# Patient Record
Sex: MALE | Race: WHITE | NOT HISPANIC OR LATINO | Employment: OTHER | URBAN - METROPOLITAN AREA
[De-identification: names, ages, dates, MRNs, and addresses within clinical notes are randomized per-mention and may not be internally consistent; named-entity substitution may affect disease eponyms.]

---

## 2018-03-26 ENCOUNTER — OFFICE VISIT (OUTPATIENT)
Dept: NEUROLOGY | Facility: CLINIC | Age: 73
End: 2018-03-26
Payer: MEDICARE

## 2018-03-26 VITALS
HEART RATE: 104 BPM | RESPIRATION RATE: 18 BRPM | WEIGHT: 224 LBS | DIASTOLIC BLOOD PRESSURE: 70 MMHG | HEIGHT: 70 IN | BODY MASS INDEX: 32.07 KG/M2 | SYSTOLIC BLOOD PRESSURE: 108 MMHG

## 2018-03-26 DIAGNOSIS — R20.2 NUMBNESS AND TINGLING OF BOTH FEET: Primary | ICD-10-CM

## 2018-03-26 DIAGNOSIS — M54.16 LUMBAR RADICULAR PAIN: ICD-10-CM

## 2018-03-26 DIAGNOSIS — L81.9 DISCOLORATION OF SKIN: ICD-10-CM

## 2018-03-26 DIAGNOSIS — C91.10 CLL (CHRONIC LYMPHOCYTIC LEUKEMIA): ICD-10-CM

## 2018-03-26 DIAGNOSIS — R20.0 NUMBNESS AND TINGLING OF BOTH FEET: Primary | ICD-10-CM

## 2018-03-26 PROCEDURE — 99999 PR STA SHADOW: CPT | Mod: PBBFAC,,, | Performed by: PSYCHIATRY & NEUROLOGY

## 2018-03-26 PROCEDURE — 99204 OFFICE O/P NEW MOD 45 MIN: CPT | Mod: S$PBB | Performed by: PSYCHIATRY & NEUROLOGY

## 2018-03-26 PROCEDURE — 99999 PR PBB SHADOW E&M-NEW PATIENT-LVL IV: CPT | Mod: PBBFAC,,, | Performed by: PSYCHIATRY & NEUROLOGY

## 2018-03-26 PROCEDURE — 99204 OFFICE O/P NEW MOD 45 MIN: CPT | Mod: PBBFAC | Performed by: PSYCHIATRY & NEUROLOGY

## 2018-03-26 RX ORDER — ACETAMINOPHEN 500 MG
1 TABLET ORAL DAILY
COMMUNITY

## 2018-03-26 RX ORDER — ASPIRIN 81 MG/1
81 TABLET ORAL DAILY
COMMUNITY

## 2018-03-26 RX ORDER — ALPRAZOLAM 1 MG/1
1 TABLET ORAL 2 TIMES DAILY PRN
Refills: 0 | COMMUNITY
Start: 2017-12-26

## 2018-03-26 RX ORDER — MULTIVITAMIN
1 TABLET ORAL DAILY
COMMUNITY

## 2018-03-26 RX ORDER — GABAPENTIN 100 MG/1
CAPSULE ORAL
Qty: 90 CAPSULE | Refills: 12 | Status: SHIPPED | OUTPATIENT
Start: 2018-03-26

## 2018-03-26 NOTE — PROGRESS NOTES
Consult from ROOPA Figueroa    HPI: Sunny Douglas is a 72 y.o. male with history of pain in the leg which started as numbness and tingling in the right lower leg below the knee starting 5 years ago. He has progressive numbness in the right foot and this has started in the left leg as well since a 2 years or so. There is no numbness in the hands.  He does not use any for pain  He does have back pain which is radicular to the posterior legs down the ankles bilaterally for many years as well.   MRI L spine was tried but patient was too claustrophobic no medications were given /patient refused MRI  Patient had B12, TSh, CMP, CBC with PCP  A few weeks ago, patient had DVT US which he states was negative for any clot int he legs.  He was given compression stocking with cardiology who told him he has no PAD.   He mentions stress over a case going on with property settlement  The patient stopped drinking EtOH in august- he was drinking 6 pack of beer per day.   Review of Systems   HENT: Negative for nosebleeds.    Eyes: Negative for double vision.   Respiratory: Negative for hemoptysis.    Cardiovascular: Negative for leg swelling.   Gastrointestinal: Negative for blood in stool.   Genitourinary: Negative for hematuria.   Musculoskeletal: Positive for back pain.   Skin: Negative for rash.   Neurological: Positive for sensory change.   Psychiatric/Behavioral: The patient does not have insomnia.          I have reviewed all of this patient's past medical and surgical histories as well as family and social histories and active allergies and medications as documented in the electronic medical record.        Exam:  Gen Appearance, well developed/nourished in no apparent distress  CV: 2+ distal pulses with no edema or swelling  Neuro:  MS: Awake, alert, oriented to place, person, time, situation. Sustains attention. Recent/remote memory intact, Language is full to spontaneous speech/repetition/naming/comprehension. Fund of Knowledge  is full  CN: Optic discs are flat with normal vasculature, PERRL, Extraoccular movements and visual fields are full. Normal facial sensation and strength, Hearing symmetric, Tongue and Palate are midline and strong. Shoulder Shrug symmetric and strong.  Motor: Normal bulk, tone, no abnormal movements. 5/5 strength bilateral upper/lower extremities with 1+ reflexes and no clonus  Sensory: reduced to  light touch, pain, temp, and vibration in the feet Romberg mildly positive  Cerebellar: Finger-nose,Heal-shin, Rapid alternating movements intact  Gait: Normal stance, no ataxia  There is red petechial like discoloration of the lower legs/ feet. Patient reports this has been present for many years        Assessment/Plan: Sunny Douglas is a 72 y.o. male with numbness in the right then left lower legs/feet for the past 5 years. He also has lumbar radicular pain, varicose veins, and skin lesions in the distal legs.   I recommend:   1. CT L spine Can't tolerate MRI L spine due to severe claustrophobia  2. Obtain  B12, TSh, CMP, CBC done with PCP recently.  Note his history of CLL which can contribute to neuropathy. Note also his heavy alcohol use until 8/2017 which could contribute to these symptoms  3. EMG/NCS of the legs  4. Refer to dermatology given discoloration in the leg  5. Note he has lower extremity varicose veins which may  contribute to pain- was given compression stockings by cardiology.  6. Gabapentin trial per orders Unless sedation, mood changes or other side effects  RTC for EMG/NCS   CC:  ROOPA Figueroa

## 2018-03-26 NOTE — LETTER
March 26, 2018      Jose E Figueroa NP  [address]  [address]  [place]  [place] [address]           [facility]  [address] [facility] [place] [address]  Phone: [phone]  Fax: [phone]          Patient: Sunny Douglas   MR Number: [id number]   YOB: 1945   Date of Visit: 3/26/2018       Dear Jose E Figueroa:    Thank you for referring Sunny Douglas to me for evaluation. Attached you will find relevant portions of my assessment and plan of care.    If you have questions, please do not hesitate to call me. I look forward to following Sunny Douglas along with you.    Sincerely,    James Vigil MD    Enclosure  CC:  No Recipients    If you would like to receive this communication electronically, please contact [email or web address] or [phone] to request more information on [facility] [employer] access.    For providers and/or their staff who would like to refer a patient to [facility], please contact us through our one-stop-shop provider referral line, [facility], at [phone].    If you feel you have received this communication in error or would no longer like to receive these types of communications, please e-mail [email or web address]

## 2018-03-27 ENCOUNTER — TELEPHONE (OUTPATIENT)
Dept: NEUROLOGY | Facility: CLINIC | Age: 73
End: 2018-03-27

## 2018-03-27 NOTE — TELEPHONE ENCOUNTER
Spoke with patient he states that he takes his Xanax BID not once a day, I explained to patient that  was not changing his Xanax that he was to follow the prescribing instructions and I updated his medication card to reflect the correct way he takes the medication.

## 2018-03-27 NOTE — TELEPHONE ENCOUNTER
----- Message from Destiny Chakraborty sent at 3/27/2018 12:09 PM CDT -----  Contact: PATIENT  Sunny Douglas  MRN: [id number]  : 1945  PCP: Jose E Figueroa  Home Phone      [phone]  Work Phone      Not on file.  Mobile          [phone]      MESSAGE: Patient states that he was reading over his AVS and noticed that there was a change in the way he takes the Xanax.  He states that he usually takes this medication twice daily but the AVS states that is is to be changed to 1 daily.  Please clarify.      Phone: [phone]

## 2018-04-03 ENCOUNTER — HOSPITAL ENCOUNTER (OUTPATIENT)
Dept: RADIOLOGY | Facility: HOSPITAL | Age: 73
Discharge: HOME | End: 2018-04-03
Attending: PSYCHIATRY & NEUROLOGY
Payer: MEDICARE

## 2018-04-03 DIAGNOSIS — R20.2 NUMBNESS AND TINGLING OF BOTH FEET: ICD-10-CM

## 2018-04-03 DIAGNOSIS — M54.16 LUMBAR RADICULAR PAIN: ICD-10-CM

## 2018-04-03 DIAGNOSIS — R20.0 NUMBNESS AND TINGLING OF BOTH FEET: ICD-10-CM

## 2018-04-03 PROCEDURE — 72131 CT LUMBAR SPINE W/O DYE: CPT | Mod: TC

## 2018-04-03 PROCEDURE — 72131 CT LUMBAR SPINE W/O DYE: CPT | Mod: 26,,, | Performed by: RADIOLOGY

## 2018-04-19 ENCOUNTER — PROCEDURE VISIT (OUTPATIENT)
Dept: NEUROLOGY | Facility: CLINIC | Age: 73
End: 2018-04-19
Payer: MEDICARE

## 2018-04-19 DIAGNOSIS — R20.2 NUMBNESS AND TINGLING OF BOTH FEET: ICD-10-CM

## 2018-04-19 DIAGNOSIS — G62.9 POLYNEUROPATHY: ICD-10-CM

## 2018-04-19 DIAGNOSIS — R20.0 NUMBNESS AND TINGLING OF BOTH FEET: ICD-10-CM

## 2018-04-19 DIAGNOSIS — M54.16 LUMBAR RADICULAR PAIN: ICD-10-CM

## 2018-04-19 DIAGNOSIS — M54.17 LUMBOSACRAL RADICULOPATHY: Primary | ICD-10-CM

## 2018-04-19 PROCEDURE — 99999 PR STA SHADOW: CPT | Mod: 26,PBBFAC,, | Performed by: PSYCHIATRY & NEUROLOGY

## 2018-04-19 PROCEDURE — 95886 MUSC TEST DONE W/N TEST COMP: CPT | Mod: 26,S$PBB | Performed by: PSYCHIATRY & NEUROLOGY

## 2018-04-19 PROCEDURE — 95910 NRV CNDJ TEST 7-8 STUDIES: CPT | Mod: PBBFAC | Performed by: PSYCHIATRY & NEUROLOGY

## 2018-04-19 NOTE — PROCEDURES
EMG W/ ULTRASOUND AND NERVE CONDUCTION TEST 2 Extremities  Date/Time: 4/19/2018 4:35 PM  Performed by: JAMES JACKSON  Authorized by: JAMES JACKSON       REPORT OF EMG and NERVE CONDUCTION STUDY    Name: Sunny Douglas  Date of Study:  4/19/18  Referring Physician:  Musa  Test Performed by:  MD Musa  Full Values Attached  Informed Consent Scanned.   No anesthesia used.   Amount of Blood Loss: none. The patient tolerated this procedure well.       Informed consent was obtained prior to performing this study. Two patient identifiers were confirmed with the patient prior to performing this study. A time out to determine correct patient and and agreement on procedure performed was conducted prior to the concentric needle examination.    Reason for the study:  Numb feet, back and leg pain      Findings:   Nerve conduction studies of the bilateral peroneal motor, bilateral tibial motor, bilateral sural nerves and bilateral H-reflexes were performed.   Amplitudes were reduced throughout without conduction block. Some SNAPs and CMAPs were absent. Otherwise, , distal latencies and conduction velocities were better preserved. . H reflexes were absent bilaterally   EMG of selected muscles of the bilateral legs  and bilateral lumbar and sacral paraspinal muscles were performed as indicated on the attached sheets. There was mild increased insertional activity and large polyphasic units in the muscles of the bilateral L5-S1 myotome with some increased insertional activity in the bilateral L5 more than S1 paraspinal muscles. Otherwise,  Insertional activity was normal without fasciculation or fibrillation, normal sized and phasia of motor units.      Impression:  Abnormal Study secondary to the Presence of:      1. 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 Bilateral Lumbosacral Radiculopathy at L5 more than S1      James Jackson M.D. [facility].     Recommendations (discussed  at this visit):  1. CMP, CBC, TSh, B12, ESR, Folate from PCP unremarkable. His recent heavy Etoh use and possibly his CLL are contributing to his neuropathy  2. Reviewed his lumbar disc changes by CT. Options for further treatment include Physical Therapy consult, interventional pain management consult to consider injections or other interventions, and spine spine surgery consult discussed. He agrees to PT. He has not started gabapentin to date and will consider for his pain.  3. He states he saw dermatology about LE skin lesions. Was given a cream to use  4. RTC 6 weeks